# Patient Record
Sex: FEMALE | Race: WHITE
[De-identification: names, ages, dates, MRNs, and addresses within clinical notes are randomized per-mention and may not be internally consistent; named-entity substitution may affect disease eponyms.]

---

## 2020-02-20 ENCOUNTER — HOSPITAL ENCOUNTER (INPATIENT)
Dept: HOSPITAL 54 - ER | Age: 70
LOS: 7 days | Discharge: NURSING, REHAB OR LONG-TERM CARE | DRG: 917 | End: 2020-02-27
Attending: NURSE PRACTITIONER | Admitting: NURSE PRACTITIONER
Payer: COMMERCIAL

## 2020-02-20 VITALS — SYSTOLIC BLOOD PRESSURE: 132 MMHG | DIASTOLIC BLOOD PRESSURE: 75 MMHG

## 2020-02-20 VITALS — SYSTOLIC BLOOD PRESSURE: 129 MMHG | DIASTOLIC BLOOD PRESSURE: 77 MMHG

## 2020-02-20 VITALS — SYSTOLIC BLOOD PRESSURE: 132 MMHG | DIASTOLIC BLOOD PRESSURE: 72 MMHG

## 2020-02-20 VITALS — DIASTOLIC BLOOD PRESSURE: 78 MMHG | SYSTOLIC BLOOD PRESSURE: 130 MMHG

## 2020-02-20 VITALS — SYSTOLIC BLOOD PRESSURE: 137 MMHG | DIASTOLIC BLOOD PRESSURE: 71 MMHG

## 2020-02-20 VITALS — SYSTOLIC BLOOD PRESSURE: 136 MMHG | DIASTOLIC BLOOD PRESSURE: 92 MMHG

## 2020-02-20 VITALS — SYSTOLIC BLOOD PRESSURE: 134 MMHG | DIASTOLIC BLOOD PRESSURE: 72 MMHG

## 2020-02-20 VITALS — SYSTOLIC BLOOD PRESSURE: 131 MMHG | DIASTOLIC BLOOD PRESSURE: 76 MMHG

## 2020-02-20 VITALS — SYSTOLIC BLOOD PRESSURE: 135 MMHG | DIASTOLIC BLOOD PRESSURE: 75 MMHG

## 2020-02-20 VITALS — BODY MASS INDEX: 19.63 KG/M2 | HEIGHT: 64 IN | WEIGHT: 115 LBS

## 2020-02-20 VITALS — SYSTOLIC BLOOD PRESSURE: 139 MMHG | DIASTOLIC BLOOD PRESSURE: 75 MMHG

## 2020-02-20 VITALS — DIASTOLIC BLOOD PRESSURE: 77 MMHG | SYSTOLIC BLOOD PRESSURE: 134 MMHG

## 2020-02-20 VITALS — SYSTOLIC BLOOD PRESSURE: 124 MMHG | DIASTOLIC BLOOD PRESSURE: 67 MMHG

## 2020-02-20 VITALS — DIASTOLIC BLOOD PRESSURE: 78 MMHG | SYSTOLIC BLOOD PRESSURE: 132 MMHG

## 2020-02-20 VITALS — SYSTOLIC BLOOD PRESSURE: 137 MMHG | DIASTOLIC BLOOD PRESSURE: 58 MMHG

## 2020-02-20 DIAGNOSIS — Z88.0: ICD-10-CM

## 2020-02-20 DIAGNOSIS — I12.9: ICD-10-CM

## 2020-02-20 DIAGNOSIS — D64.9: ICD-10-CM

## 2020-02-20 DIAGNOSIS — K56.41: ICD-10-CM

## 2020-02-20 DIAGNOSIS — A87.9: ICD-10-CM

## 2020-02-20 DIAGNOSIS — E87.2: ICD-10-CM

## 2020-02-20 DIAGNOSIS — R41.3: ICD-10-CM

## 2020-02-20 DIAGNOSIS — R40.2433: ICD-10-CM
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
## 2020-02-20 DIAGNOSIS — M19.90: ICD-10-CM

## 2020-02-20 DIAGNOSIS — G92: ICD-10-CM

## 2020-02-20 DIAGNOSIS — E86.0: ICD-10-CM

## 2020-02-20 DIAGNOSIS — J69.0: ICD-10-CM

## 2020-02-20 DIAGNOSIS — R26.81: ICD-10-CM

## 2020-02-20 DIAGNOSIS — N18.9: ICD-10-CM

## 2020-02-20 DIAGNOSIS — N17.0: ICD-10-CM

## 2020-02-20 DIAGNOSIS — A41.9: ICD-10-CM

## 2020-02-20 DIAGNOSIS — F32.9: ICD-10-CM

## 2020-02-20 DIAGNOSIS — T42.4X1A: Primary | ICD-10-CM

## 2020-02-20 DIAGNOSIS — S06.6X0A: ICD-10-CM

## 2020-02-20 DIAGNOSIS — M62.82: ICD-10-CM

## 2020-02-20 DIAGNOSIS — M06.9: ICD-10-CM
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
## 2020-02-20 DIAGNOSIS — J32.9: ICD-10-CM

## 2020-02-20 DIAGNOSIS — E03.9: ICD-10-CM

## 2020-02-20 DIAGNOSIS — I21.4: ICD-10-CM

## 2020-02-20 DIAGNOSIS — E78.5: ICD-10-CM

## 2020-02-20 LAB
ALBUMIN SERPL BCP-MCNC: 3.2 G/DL (ref 3.4–5)
ALP SERPL-CCNC: 134 U/L (ref 46–116)
ALT SERPL W P-5'-P-CCNC: 106 U/L (ref 12–78)
APAP SERPL-MCNC: 0 UG/ML (ref 10–30)
AST SERPL W P-5'-P-CCNC: 139 U/L (ref 15–37)
BASOPHILS # BLD AUTO: 0 /CMM (ref 0–0.2)
BASOPHILS NFR BLD AUTO: 0.1 % (ref 0–2)
BILIRUB DIRECT SERPL-MCNC: 0.1 MG/DL (ref 0–0.2)
BILIRUB SERPL-MCNC: 0.3 MG/DL (ref 0.2–1)
BUN SERPL-MCNC: 30 MG/DL (ref 7–18)
CALCIUM SERPL-MCNC: 9.4 MG/DL (ref 8.5–10.1)
CHLORIDE SERPL-SCNC: 105 MMOL/L (ref 98–107)
CO2 SERPL-SCNC: 20 MMOL/L (ref 21–32)
CREAT SERPL-MCNC: 1.7 MG/DL (ref 0.6–1.3)
EOSINOPHIL NFR BLD AUTO: 0 % (ref 0–6)
ETHANOL SERPL-MCNC: < 3 MG/DL (ref 0–0)
GLUCOSE SERPL-MCNC: 144 MG/DL (ref 74–106)
HCT VFR BLD AUTO: 32 % (ref 33–45)
HGB BLD-MCNC: 10.6 G/DL (ref 11.5–14.8)
LYMPHOCYTES NFR BLD AUTO: 0.6 /CMM (ref 0.8–4.8)
LYMPHOCYTES NFR BLD AUTO: 3.7 % (ref 20–44)
MCHC RBC AUTO-ENTMCNC: 33 G/DL (ref 31–36)
MCV RBC AUTO: 97 FL (ref 82–100)
MONOCYTES NFR BLD AUTO: 0.4 /CMM (ref 0.1–1.3)
MONOCYTES NFR BLD AUTO: 2.6 % (ref 2–12)
NEUTROPHILS # BLD AUTO: 16.2 /CMM (ref 1.8–8.9)
NEUTROPHILS NFR BLD AUTO: 93.6 % (ref 43–81)
PH UR STRIP: 5.5 [PH] (ref 5–8)
PLATELET # BLD AUTO: 592 /CMM (ref 150–450)
POTASSIUM SERPL-SCNC: 3.9 MMOL/L (ref 3.5–5.1)
PROT SERPL-MCNC: 7.2 G/DL (ref 6.4–8.2)
RBC # BLD AUTO: 3.31 MIL/UL (ref 4–5.2)
RBC #/AREA URNS HPF: (no result) /HPF (ref 0–2)
SALICYLATES SERPL-MCNC: 1.6 MG/DL (ref 2.8–20)
SODIUM SERPL-SCNC: 142 MMOL/L (ref 136–145)
UROBILINOGEN UR STRIP-MCNC: 0.2 EU/DL
WBC #/AREA URNS HPF: (no result) /HPF (ref 0–3)
WBC NRBC COR # BLD AUTO: 17.3 K/UL (ref 4.3–11)

## 2020-02-20 PROCEDURE — G0378 HOSPITAL OBSERVATION PER HR: HCPCS

## 2020-02-20 PROCEDURE — A4216 STERILE WATER/SALINE, 10 ML: HCPCS

## 2020-02-20 PROCEDURE — G0480 DRUG TEST DEF 1-7 CLASSES: HCPCS

## 2020-02-20 RX ADMIN — ATORVASTATIN CALCIUM SCH MG: 10 TABLET, FILM COATED ORAL at 22:00

## 2020-02-20 RX ADMIN — Medication SCH MG: at 21:00

## 2020-02-20 RX ADMIN — Medication SCH MG: at 17:00

## 2020-02-20 RX ADMIN — GABAPENTIN SCH MG: 300 CAPSULE ORAL at 17:00

## 2020-02-20 RX ADMIN — MEROPENEM SCH MLS/HR: 1 INJECTION INTRAVENOUS at 18:09

## 2020-02-20 RX ADMIN — SODIUM CHLORIDE PRN MLS/HR: 9 INJECTION, SOLUTION INTRAVENOUS at 15:53

## 2020-02-20 RX ADMIN — GABAPENTIN SCH MG: 400 CAPSULE ORAL at 22:00

## 2020-02-20 RX ADMIN — PANTOPRAZOLE SODIUM SCH MG: 40 TABLET, DELAYED RELEASE ORAL at 16:30

## 2020-02-20 NOTE — NUR
ICU RN. INITIAL ASSESSMENT. RECEIVED THE PT REST ON THE BED, VERY LETHARGIC. NAME 
RESPONDING. CARDIAC MONITOR SHOWING S TACH. ROOM AIR SAT 99%. NO ACUTE DISTRESS NOTED. HOB 
ELEVATED. PT IS NPO. KATTY SOFT WRIST RESTRAINT CHECKED AND RELEASED, NO INJURY OR REDNESS 
NOTED. IV RT AC 20G.  ML/H. WILL CONTINUE TO MONITOR VITALS.

## 2020-02-20 NOTE — NUR
RECEIVED PT FROM ER TO . PT NON VERBAL, RESPONSIVE TO TOUCH AND PAINFUL STIMULI ONLY, 
OPENS EYES OCCASIONALLY, DOES NOT FOLLOW COMMANDS. 2 SONS BY BEDSIDE. REPORTED THEY WERE 
UNABLE TO REACH HER BY PHONE, DROVE TO HER APARTMENT AND FOUND HER ON THE FLOOR UNCONSCIOUS.

PT ON ROOM AIR, SATURATING WELL, RESPIRATIONS EVEN AND UNLABORED, NO SIGNS OF RESPIRATORY 
DISTRESS NOTED. IV SITE ON RIGHT AC G20 INTACT, PATENT, NS INFUSING, NO SIGNS OF 
INFILTRATION NOTED. SINUS TACHY ON MONITOR . SOFT BILATERAL WRIST RESTRAINTS IN PLACE, 
SKIN CHECKED FOR CIRCULATION AND BILATERAL PULSES. HEAD TO TOE ASSESSMENT PERFORMED- LEFT 
FOREARM SKIN TEAR NOTED, PHOTO TAKEN, PLACED INTO CHART. CLEANSED SKIN TEAR WITH NS AND 
COVERED WOUND WITH MEPILEX. BED IN LOW POSITION, LOCKED, CALL LIGHT WITHIN REACH. WILL 
CONTINUE TO MONITOR CLOSELY.

## 2020-02-20 NOTE — NUR
ATTEMPTED MULTIPLE TIMES TO INSERT COOK CATHETER, CHARGE NURSE MARCELINA SCHNEIDER ATTEMPTED 
TOO. UNSUCCESSFUL. ANTONIA NOTIFIED.

## 2020-02-21 VITALS — DIASTOLIC BLOOD PRESSURE: 97 MMHG | SYSTOLIC BLOOD PRESSURE: 137 MMHG

## 2020-02-21 VITALS — SYSTOLIC BLOOD PRESSURE: 154 MMHG | DIASTOLIC BLOOD PRESSURE: 50 MMHG

## 2020-02-21 VITALS — SYSTOLIC BLOOD PRESSURE: 132 MMHG | DIASTOLIC BLOOD PRESSURE: 64 MMHG

## 2020-02-21 VITALS — DIASTOLIC BLOOD PRESSURE: 76 MMHG | SYSTOLIC BLOOD PRESSURE: 128 MMHG

## 2020-02-21 VITALS — SYSTOLIC BLOOD PRESSURE: 137 MMHG | DIASTOLIC BLOOD PRESSURE: 89 MMHG

## 2020-02-21 VITALS — SYSTOLIC BLOOD PRESSURE: 138 MMHG | DIASTOLIC BLOOD PRESSURE: 73 MMHG

## 2020-02-21 VITALS — SYSTOLIC BLOOD PRESSURE: 139 MMHG | DIASTOLIC BLOOD PRESSURE: 73 MMHG

## 2020-02-21 VITALS — DIASTOLIC BLOOD PRESSURE: 77 MMHG | SYSTOLIC BLOOD PRESSURE: 140 MMHG

## 2020-02-21 VITALS — DIASTOLIC BLOOD PRESSURE: 49 MMHG | SYSTOLIC BLOOD PRESSURE: 126 MMHG

## 2020-02-21 VITALS — SYSTOLIC BLOOD PRESSURE: 133 MMHG | DIASTOLIC BLOOD PRESSURE: 80 MMHG

## 2020-02-21 VITALS — SYSTOLIC BLOOD PRESSURE: 133 MMHG | DIASTOLIC BLOOD PRESSURE: 72 MMHG

## 2020-02-21 VITALS — DIASTOLIC BLOOD PRESSURE: 73 MMHG | SYSTOLIC BLOOD PRESSURE: 129 MMHG

## 2020-02-21 VITALS — SYSTOLIC BLOOD PRESSURE: 133 MMHG | DIASTOLIC BLOOD PRESSURE: 79 MMHG

## 2020-02-21 VITALS — SYSTOLIC BLOOD PRESSURE: 139 MMHG | DIASTOLIC BLOOD PRESSURE: 85 MMHG

## 2020-02-21 VITALS — DIASTOLIC BLOOD PRESSURE: 76 MMHG | SYSTOLIC BLOOD PRESSURE: 129 MMHG

## 2020-02-21 VITALS — DIASTOLIC BLOOD PRESSURE: 77 MMHG | SYSTOLIC BLOOD PRESSURE: 128 MMHG

## 2020-02-21 VITALS — DIASTOLIC BLOOD PRESSURE: 69 MMHG | SYSTOLIC BLOOD PRESSURE: 126 MMHG

## 2020-02-21 VITALS — SYSTOLIC BLOOD PRESSURE: 124 MMHG | DIASTOLIC BLOOD PRESSURE: 77 MMHG

## 2020-02-21 VITALS — SYSTOLIC BLOOD PRESSURE: 137 MMHG | DIASTOLIC BLOOD PRESSURE: 77 MMHG

## 2020-02-21 VITALS — SYSTOLIC BLOOD PRESSURE: 140 MMHG | DIASTOLIC BLOOD PRESSURE: 78 MMHG

## 2020-02-21 VITALS — SYSTOLIC BLOOD PRESSURE: 135 MMHG | DIASTOLIC BLOOD PRESSURE: 81 MMHG

## 2020-02-21 VITALS — DIASTOLIC BLOOD PRESSURE: 70 MMHG | SYSTOLIC BLOOD PRESSURE: 120 MMHG

## 2020-02-21 VITALS — SYSTOLIC BLOOD PRESSURE: 135 MMHG | DIASTOLIC BLOOD PRESSURE: 78 MMHG

## 2020-02-21 VITALS — DIASTOLIC BLOOD PRESSURE: 69 MMHG | SYSTOLIC BLOOD PRESSURE: 138 MMHG

## 2020-02-21 VITALS — SYSTOLIC BLOOD PRESSURE: 131 MMHG | DIASTOLIC BLOOD PRESSURE: 68 MMHG

## 2020-02-21 VITALS — DIASTOLIC BLOOD PRESSURE: 58 MMHG | SYSTOLIC BLOOD PRESSURE: 96 MMHG

## 2020-02-21 VITALS — SYSTOLIC BLOOD PRESSURE: 118 MMHG | DIASTOLIC BLOOD PRESSURE: 69 MMHG

## 2020-02-21 VITALS — SYSTOLIC BLOOD PRESSURE: 137 MMHG | DIASTOLIC BLOOD PRESSURE: 72 MMHG

## 2020-02-21 VITALS — DIASTOLIC BLOOD PRESSURE: 65 MMHG | SYSTOLIC BLOOD PRESSURE: 128 MMHG

## 2020-02-21 VITALS — SYSTOLIC BLOOD PRESSURE: 128 MMHG | DIASTOLIC BLOOD PRESSURE: 63 MMHG

## 2020-02-21 VITALS — DIASTOLIC BLOOD PRESSURE: 80 MMHG | SYSTOLIC BLOOD PRESSURE: 133 MMHG

## 2020-02-21 LAB
APPEARANCE UR: CLEAR
BASE EXCESS BLDA CALC-SCNC: -5.4 MMOL/L
BASE EXCESS BLDA CALC-SCNC: -7.3 MMOL/L
BASOPHILS # BLD AUTO: 0 /CMM (ref 0–0.2)
BASOPHILS NFR BLD AUTO: 0.3 % (ref 0–2)
BILIRUB UR QL STRIP: NEGATIVE
BUN SERPL-MCNC: 21 MG/DL (ref 7–18)
CALCIUM SERPL-MCNC: 8.1 MG/DL (ref 8.5–10.1)
CHLORIDE SERPL-SCNC: 110 MMOL/L (ref 98–107)
CHOLEST SERPL-MCNC: 147 MG/DL (ref ?–200)
CO2 SERPL-SCNC: 21 MMOL/L (ref 21–32)
COLOR UR: YELLOW
CREAT SERPL-MCNC: 1.1 MG/DL (ref 0.6–1.3)
CREAT UR-MCNC: 70.6 MG/DL (ref 30–125)
DO-HGB MFR BLDA: 35.6 MMHG
DO-HGB MFR BLDA: 42.9 MMHG
EOSINOPHIL NFR BLD AUTO: 0 % (ref 0–6)
GLUCOSE CSF-MCNC: 54 MG/DL (ref 40–70)
GLUCOSE SERPL-MCNC: 101 MG/DL (ref 74–106)
GLUCOSE UR STRIP-MCNC: NEGATIVE MG/DL
HCT VFR BLD AUTO: 27 % (ref 33–45)
HDLC SERPL-MCNC: 60 MG/DL (ref 40–60)
HGB BLD-MCNC: 8.9 G/DL (ref 11.5–14.8)
HGB UR QL STRIP: (no result) ERY/UL
INHALED O2 CONCENTRATION: 21 %
INHALED O2 CONCENTRATION: 21 %
KETONES UR STRIP-MCNC: 15 MG/DL
LDLC SERPL DIRECT ASSAY-MCNC: 72 MG/DL (ref 0–99)
LEUKOCYTE ESTERASE UR QL STRIP: NEGATIVE
LYMPHOCYTES NFR BLD AUTO: 0.8 /CMM (ref 0.8–4.8)
LYMPHOCYTES NFR BLD AUTO: 6 % (ref 20–44)
MAGNESIUM SERPL-MCNC: 2.2 MG/DL (ref 1.8–2.4)
MCHC RBC AUTO-ENTMCNC: 34 G/DL (ref 31–36)
MCV RBC AUTO: 95 FL (ref 82–100)
MONOCYTES NFR BLD AUTO: 0.6 /CMM (ref 0.1–1.3)
MONOCYTES NFR BLD AUTO: 4.3 % (ref 2–12)
NEUTROPHILS # BLD AUTO: 12.1 /CMM (ref 1.8–8.9)
NEUTROPHILS NFR BLD AUTO: 89.4 % (ref 43–81)
NITRITE UR QL STRIP: NEGATIVE
PCO2 TEMP ADJ BLDA: 17.9 MMHG (ref 35–45)
PCO2 TEMP ADJ BLDA: 22.4 MMHG (ref 35–45)
PH TEMP ADJ BLDA: 7.49 [PH] (ref 7.35–7.45)
PH TEMP ADJ BLDA: 7.51 [PH] (ref 7.35–7.45)
PH UR STRIP: 6 [PH] (ref 5–8)
PHOSPHATE SERPL-MCNC: 2.9 MG/DL (ref 2.5–4.9)
PLATELET # BLD AUTO: 403 /CMM (ref 150–450)
PO2 TEMP ADJ BLDA: 85.4 MMHG (ref 75–100)
PO2 TEMP ADJ BLDA: 87.3 MMHG (ref 75–100)
POTASSIUM SERPL-SCNC: 3.4 MMOL/L (ref 3.5–5.1)
PROT CSF-MCNC: 147.5 MG/DL (ref 15–45)
PROT UR QL STRIP: NEGATIVE MG/DL
PROT UR-MCNC: 51.3 MG/DL (ref 0–11.9)
RBC # BLD AUTO: 2.8 MIL/UL (ref 4–5.2)
RBC #/AREA URNS HPF: (no result) /HPF (ref 0–2)
SAO2 % BLDA: 96.1 % (ref 92–98.5)
SAO2 % BLDA: 96.1 % (ref 92–98.5)
SODIUM SERPL-SCNC: 144 MMOL/L (ref 136–145)
SODIUM UR-SCNC: 139 MMOL/L (ref 40–220)
TRIGL SERPL-MCNC: 60 MG/DL (ref 30–150)
UROBILINOGEN UR STRIP-MCNC: 0.2 EU/DL
VENTILATION MODE VENT: (no result)
VENTILATION MODE VENT: (no result)
WBC #/AREA URNS HPF: (no result) /HPF (ref 0–3)
WBC NRBC COR # BLD AUTO: 13.5 K/UL (ref 4.3–11)

## 2020-02-21 RX ADMIN — CALCIUM CARBONATE-CHOLECALCIFEROL TAB 250 MG-125 UNIT SCH UDTAB: 250-125 TAB at 08:03

## 2020-02-21 RX ADMIN — ATORVASTATIN CALCIUM SCH MG: 10 TABLET, FILM COATED ORAL at 21:11

## 2020-02-21 RX ADMIN — LEVOTHYROXINE SODIUM SCH MCG: 100 TABLET ORAL at 08:03

## 2020-02-21 RX ADMIN — PANTOPRAZOLE SODIUM SCH MG: 40 TABLET, DELAYED RELEASE ORAL at 08:04

## 2020-02-21 RX ADMIN — Medication SCH MG: at 17:16

## 2020-02-21 RX ADMIN — Medication SCH MG: at 21:11

## 2020-02-21 RX ADMIN — GABAPENTIN SCH MG: 400 CAPSULE ORAL at 21:11

## 2020-02-21 RX ADMIN — DEXTROSE MONOHYDRATE SCH MLS/HR: 50 INJECTION, SOLUTION INTRAVENOUS at 16:46

## 2020-02-21 RX ADMIN — GABAPENTIN SCH MG: 300 CAPSULE ORAL at 08:04

## 2020-02-21 RX ADMIN — MEROPENEM SCH MLS/HR: 1 INJECTION INTRAVENOUS at 18:07

## 2020-02-21 RX ADMIN — DEXTROSE MONOHYDRATE SCH MLS/HR: 50 INJECTION, SOLUTION INTRAVENOUS at 21:10

## 2020-02-21 RX ADMIN — SODIUM CHLORIDE PRN MLS/HR: 9 INJECTION, SOLUTION INTRAVENOUS at 02:30

## 2020-02-21 RX ADMIN — Medication SCH MG: at 09:00

## 2020-02-21 RX ADMIN — GABAPENTIN SCH MG: 300 CAPSULE ORAL at 13:08

## 2020-02-21 RX ADMIN — MEROPENEM SCH MLS/HR: 1 INJECTION INTRAVENOUS at 06:52

## 2020-02-21 RX ADMIN — Medication SCH MG: at 08:04

## 2020-02-21 RX ADMIN — FOLIC ACID SCH MG: 1 TABLET ORAL at 08:03

## 2020-02-21 RX ADMIN — GABAPENTIN SCH MG: 300 CAPSULE ORAL at 17:16

## 2020-02-21 NOTE — NUR
RN OPEN NOTES



RECEIVED PATIENT AWAKE IN BED WITH FAMILY AT BEDSIDE. A/OX3. NO SIGNS OF DISTRESS OR 
DISCOMFORT. BREATHING EVEN AND UNLABORED. IV ACCESS IN RFA, PATENT AND INTACT, NO SIGNS OF 
REDNESS OR INFILTRATION. F/C INTACT DRAINING CLEAR YELLOW FLUID. BED IN LOW LOCKED POSITION 
WITH SIDE RAILS X2. BED ALARM ON. CALL LIGHT WITHIN REACH. WILL CONTINUE TO MONITOR.

## 2020-02-21 NOTE — NUR
MS/RN RECEIVING NOTES



PATIENT WAS RECEIVED FROM ICU ACCOMPANIED BY SUSU HEWITT. PATIENT IN STABLE CONDITION. PATIENT 
IS A/O X3. NOTED WITH IV ACCESS ON RFA #20G. INTACT AND PATENT. FLUSHING WELL. ALL NEEDS 
ANTICIPATED CALL LIGHT WITHIN REACHED. BED LOCKED AND IN LOWEST POSITION. SAFETY MAINTAINED. 
WILL CONTINUE TO MONITOR CLOSELY. ENDORSED TO PM NURSE FOR BOWEN.

## 2020-02-21 NOTE — NUR
ICU RN. PT AWAKE, ALERT, FOLLOW COMMANDS. SOME TIMES FORGET FULLNESS.  IV PULLED OUT. NEW IV 
PLACED. KATTY SOFT WRIST RESTRAINT INITIATED,IV RT HAND 20G. IV FLUIDS NS 125ML/H. HOB 
ELEVATED, FC PATENT. URINE DRAINING, KATTY SOFT WRIST RESTRAINT CHECKED AND RELEASED. NO 
INJURY  OR REDNESS NOTED. WILL CONTINUE TO MONITOR VITALS.

## 2020-02-21 NOTE — NUR
WOUND CARE CONSULT: PT PRESENTS WITH INCONTINENCE AND DRY ABRASIONS, BRUISING TO ARMS, 
PRESENT ON ADMISSION. RECOMMENDATIONS MADE FOR SKIN PROTECTION. DISCUSSED WITH NURSING 
STAFF. WILL SEE PRN. MD IN AGREEMENT WITH PLAN OF CARE.

-------------------------------------------------------------------------------

Addendum: 02/21/20 at 0833 by ROSALIA BEDOLLA WNDNU

-------------------------------------------------------------------------------

CORRECTION: PT INCONTINENT OF STOOL. COOK CATH NOTED.

## 2020-02-21 NOTE — NUR
received pt from night shift, s/p subarachnoid hemorrhage,  a/o x2, follows commands, 
confused at times, SR, RA sat well, NPO, f/c good output, restraints on, v/s stable, no 
pain, pt turned and repositioned.

## 2020-02-21 NOTE — NUR
pt is resting in the bed, LP done by Dr Tejeda, MRI of brain ordered instead on CT of head, 
pt is 

a/o x3, follows commands, RA, tolerates diet, good urine output, v/s stable, no pain.

## 2020-02-22 VITALS — DIASTOLIC BLOOD PRESSURE: 69 MMHG | SYSTOLIC BLOOD PRESSURE: 119 MMHG

## 2020-02-22 VITALS — DIASTOLIC BLOOD PRESSURE: 76 MMHG | SYSTOLIC BLOOD PRESSURE: 128 MMHG

## 2020-02-22 VITALS — SYSTOLIC BLOOD PRESSURE: 110 MMHG | DIASTOLIC BLOOD PRESSURE: 68 MMHG

## 2020-02-22 VITALS — DIASTOLIC BLOOD PRESSURE: 70 MMHG | SYSTOLIC BLOOD PRESSURE: 118 MMHG

## 2020-02-22 LAB
ALBUMIN SERPL BCP-MCNC: 2.4 G/DL (ref 3.4–5)
ALP SERPL-CCNC: 98 U/L (ref 46–116)
ALT SERPL W P-5'-P-CCNC: 75 U/L (ref 12–78)
AST SERPL W P-5'-P-CCNC: 82 U/L (ref 15–37)
BASOPHILS # BLD AUTO: 0 /CMM (ref 0–0.2)
BASOPHILS NFR BLD AUTO: 0.1 % (ref 0–2)
BILIRUB SERPL-MCNC: 0.2 MG/DL (ref 0.2–1)
BUN SERPL-MCNC: 22 MG/DL (ref 7–18)
CALCIUM SERPL-MCNC: 8.1 MG/DL (ref 8.5–10.1)
CHLORIDE SERPL-SCNC: 107 MMOL/L (ref 98–107)
CK SERPL-CCNC: 838 U/L (ref 26–192)
CO2 SERPL-SCNC: 25 MMOL/L (ref 21–32)
CREAT SERPL-MCNC: 1.3 MG/DL (ref 0.6–1.3)
EOSINOPHIL NFR BLD AUTO: 0 % (ref 0–6)
GLUCOSE SERPL-MCNC: 119 MG/DL (ref 74–106)
HCT VFR BLD AUTO: 27 % (ref 33–45)
HGB BLD-MCNC: 9 G/DL (ref 11.5–14.8)
LYMPHOCYTES NFR BLD AUTO: 0.9 /CMM (ref 0.8–4.8)
LYMPHOCYTES NFR BLD AUTO: 6.1 % (ref 20–44)
MAGNESIUM SERPL-MCNC: 1.8 MG/DL (ref 1.8–2.4)
MCHC RBC AUTO-ENTMCNC: 33 G/DL (ref 31–36)
MCV RBC AUTO: 96 FL (ref 82–100)
MONOCYTES NFR BLD AUTO: 0.7 /CMM (ref 0.1–1.3)
MONOCYTES NFR BLD AUTO: 4.7 % (ref 2–12)
NEUTROPHILS # BLD AUTO: 13.7 /CMM (ref 1.8–8.9)
NEUTROPHILS NFR BLD AUTO: 89.1 % (ref 43–81)
PHOSPHATE SERPL-MCNC: 1.9 MG/DL (ref 2.5–4.9)
PLATELET # BLD AUTO: 438 /CMM (ref 150–450)
POTASSIUM SERPL-SCNC: 3.3 MMOL/L (ref 3.5–5.1)
PROT SERPL-MCNC: 5.8 G/DL (ref 6.4–8.2)
RBC # BLD AUTO: 2.86 MIL/UL (ref 4–5.2)
SODIUM SERPL-SCNC: 140 MMOL/L (ref 136–145)
WBC NRBC COR # BLD AUTO: 15.4 K/UL (ref 4.3–11)

## 2020-02-22 RX ADMIN — DEXTROSE MONOHYDRATE SCH MLS/HR: 50 INJECTION, SOLUTION INTRAVENOUS at 12:06

## 2020-02-22 RX ADMIN — GABAPENTIN SCH MG: 300 CAPSULE ORAL at 08:52

## 2020-02-22 RX ADMIN — ATORVASTATIN CALCIUM SCH MG: 10 TABLET, FILM COATED ORAL at 21:28

## 2020-02-22 RX ADMIN — PANTOPRAZOLE SODIUM SCH MG: 40 TABLET, DELAYED RELEASE ORAL at 08:52

## 2020-02-22 RX ADMIN — MEROPENEM SCH MLS/HR: 1 INJECTION INTRAVENOUS at 06:56

## 2020-02-22 RX ADMIN — Medication SCH MG: at 09:00

## 2020-02-22 RX ADMIN — GABAPENTIN SCH MG: 400 CAPSULE ORAL at 21:28

## 2020-02-22 RX ADMIN — CALCIUM CARBONATE-CHOLECALCIFEROL TAB 250 MG-125 UNIT SCH UDTAB: 250-125 TAB at 08:52

## 2020-02-22 RX ADMIN — GABAPENTIN SCH MG: 300 CAPSULE ORAL at 12:05

## 2020-02-22 RX ADMIN — DEXTROSE MONOHYDRATE SCH MLS/HR: 50 INJECTION, SOLUTION INTRAVENOUS at 13:37

## 2020-02-22 RX ADMIN — FOLIC ACID SCH MG: 1 TABLET ORAL at 08:52

## 2020-02-22 RX ADMIN — Medication SCH MG: at 17:43

## 2020-02-22 RX ADMIN — DEXTROSE MONOHYDRATE SCH MLS/HR: 50 INJECTION, SOLUTION INTRAVENOUS at 05:53

## 2020-02-22 RX ADMIN — LEVOTHYROXINE SODIUM SCH MCG: 100 TABLET ORAL at 08:52

## 2020-02-22 RX ADMIN — Medication SCH MG: at 08:52

## 2020-02-22 RX ADMIN — Medication SCH MG: at 21:31

## 2020-02-22 RX ADMIN — DEXTROSE MONOHYDRATE SCH MLS/HR: 50 INJECTION, SOLUTION INTRAVENOUS at 21:28

## 2020-02-22 RX ADMIN — GABAPENTIN SCH MG: 300 CAPSULE ORAL at 17:43

## 2020-02-22 RX ADMIN — MEROPENEM SCH MLS/HR: 1 INJECTION INTRAVENOUS at 17:42

## 2020-02-22 NOTE — NUR
patient back in her room, S/P CTA head, tolerated the procedure well. In no acute distress, 
Alert and oriented to her base line.  Will continue to monitor. Son at bed side, Encouraged 
to drink fluids

## 2020-02-22 NOTE — NUR
CTA head results relayed to Mateo Pappas and April Farooq with no new orders at this 
time, Will closely monitor patient

## 2020-02-22 NOTE — NUR
appeared alert, but some memory deficits noted, younger son just stopped by to visit, left.  
When asked her whether she saw the son, "  nope , nobody here yet.".  asked how many sons 
she has had, -  wait for awhile before she could remember, now to MRI of brain

## 2020-02-22 NOTE — NUR
PATIENT REMAIN ALERT,NO ACUTE DISTRESS,NSR, SON AT BEDSIDE CALL LIGHT AT REACH,AWAITS 
RADIOLY TO DO CT SCAN AS ORDERED,SON SIGNED CONSENT.

## 2020-02-22 NOTE — NUR
RN CLOSING NOTES





PATIENT RESTING COMFORTABLY IN BED. A/OX3 WITH PERIODS OF FORGETFULNESS. NO SIGNS OF 
DISTRESS OR DISCOMFORT. BREATHING EVEN AND UNLABORED. IV ACCESS IN RFA WITH ZOVIRAX 
INFUSING, PATENT AND INTACT, NO SIGNS OF REDNESS OR INFILTRATION. F/C INTACT DRAINING CLEAR 
YELLOW FLUID. ALL NEEDS MET. NO SIGNIFICANT CHANGES THROUGH THE NIGHT. BED IN LOW LOCKED 
POSITION WITH SIDE RAILS X2. BED ALARM ON. CALL LIGHT WITHIN REACH. WILL ENDORSE TO AM SHIFT 
FOR BOWEN.

## 2020-02-22 NOTE — NUR
RELAYED MRI RESULT TO DR. CLINTON AND VERONICA KNIGHT,SON AT BEDSIDE AND ANXIOUS ABOUT MRI 
RESULT,PRIMARY RN CLARENCE READ RESULT TO SON PER SON REQUEST,VERONICA KNIGHT SPOKE ON THE PHONE 
TO SON AND EXPLAIN PLAN OF CARE,FAMILY  REASSURED ,PLACED PATIENT ON TELEMETRY.WILL CONTINUE 
TO MONITOR.

## 2020-02-22 NOTE — NUR
TELE/RN notes 

Patient received in bed, awake, A/O x3, denies any pain, no SOB, breathing even and 
unlabored. On RA, sat 98%. IV site intact, patent, Leo cath intact, draining with yellow 
color urine. Sinus tachy on the monitor, rate 108. awaiting CTA Head with contrast, both 
Sons at bed side, called Radiology, will be here soon to  patient. Safety maintained, 
bed at the lowest locked position. Call light within reach. Will continue to monitor as per 
plan of care.

## 2020-02-23 VITALS — SYSTOLIC BLOOD PRESSURE: 112 MMHG | DIASTOLIC BLOOD PRESSURE: 77 MMHG

## 2020-02-23 VITALS — SYSTOLIC BLOOD PRESSURE: 134 MMHG | DIASTOLIC BLOOD PRESSURE: 76 MMHG

## 2020-02-23 VITALS — DIASTOLIC BLOOD PRESSURE: 81 MMHG | SYSTOLIC BLOOD PRESSURE: 139 MMHG

## 2020-02-23 VITALS — SYSTOLIC BLOOD PRESSURE: 98 MMHG | DIASTOLIC BLOOD PRESSURE: 58 MMHG

## 2020-02-23 VITALS — DIASTOLIC BLOOD PRESSURE: 75 MMHG | SYSTOLIC BLOOD PRESSURE: 140 MMHG

## 2020-02-23 LAB
BASOPHILS # BLD AUTO: 0.1 /CMM (ref 0–0.2)
BASOPHILS NFR BLD AUTO: 0.8 % (ref 0–2)
BUN SERPL-MCNC: 18 MG/DL (ref 7–18)
CALCIUM SERPL-MCNC: 7.7 MG/DL (ref 8.5–10.1)
CHLORIDE SERPL-SCNC: 105 MMOL/L (ref 98–107)
CO2 SERPL-SCNC: 23 MMOL/L (ref 21–32)
CREAT SERPL-MCNC: 1.2 MG/DL (ref 0.6–1.3)
EOSINOPHIL NFR BLD AUTO: 0.2 % (ref 0–6)
GLUCOSE SERPL-MCNC: 97 MG/DL (ref 74–106)
HCT VFR BLD AUTO: 26 % (ref 33–45)
HGB BLD-MCNC: 8.5 G/DL (ref 11.5–14.8)
LYMPHOCYTES NFR BLD AUTO: 1.3 /CMM (ref 0.8–4.8)
LYMPHOCYTES NFR BLD AUTO: 9.7 % (ref 20–44)
MAGNESIUM SERPL-MCNC: 1.7 MG/DL (ref 1.8–2.4)
MCHC RBC AUTO-ENTMCNC: 33 G/DL (ref 31–36)
MCV RBC AUTO: 95 FL (ref 82–100)
MONOCYTES NFR BLD AUTO: 0.2 /CMM (ref 0.1–1.3)
MONOCYTES NFR BLD AUTO: 1.8 % (ref 2–12)
NEUTROPHILS # BLD AUTO: 11.8 /CMM (ref 1.8–8.9)
NEUTROPHILS NFR BLD AUTO: 87.5 % (ref 43–81)
PHOSPHATE SERPL-MCNC: 2.5 MG/DL (ref 2.5–4.9)
PLATELET # BLD AUTO: 469 /CMM (ref 150–450)
POTASSIUM SERPL-SCNC: 3.5 MMOL/L (ref 3.5–5.1)
RBC # BLD AUTO: 2.75 MIL/UL (ref 4–5.2)
SODIUM SERPL-SCNC: 140 MMOL/L (ref 136–145)
WBC NRBC COR # BLD AUTO: 13.5 K/UL (ref 4.3–11)

## 2020-02-23 RX ADMIN — ACETAMINOPHEN PRN MG: 325 TABLET ORAL at 04:27

## 2020-02-23 RX ADMIN — DEXTROSE MONOHYDRATE SCH MLS/HR: 50 INJECTION, SOLUTION INTRAVENOUS at 15:53

## 2020-02-23 RX ADMIN — Medication SCH MG: at 17:05

## 2020-02-23 RX ADMIN — FLUTICASONE PROPIONATE SCH GM: 50 SPRAY, METERED NASAL at 08:49

## 2020-02-23 RX ADMIN — Medication SCH MG: at 21:00

## 2020-02-23 RX ADMIN — FLUTICASONE PROPIONATE SCH GM: 50 SPRAY, METERED NASAL at 17:05

## 2020-02-23 RX ADMIN — LEVOTHYROXINE SODIUM SCH MCG: 100 TABLET ORAL at 07:45

## 2020-02-23 RX ADMIN — SORBITOL SOLUTION (BULK) SCH ML: 70 SOLUTION at 18:39

## 2020-02-23 RX ADMIN — MEROPENEM SCH MLS/HR: 1 INJECTION INTRAVENOUS at 04:55

## 2020-02-23 RX ADMIN — MAGNESIUM SULFATE IN DEXTROSE SCH MLS/HR: 10 INJECTION, SOLUTION INTRAVENOUS at 14:32

## 2020-02-23 RX ADMIN — CALCIUM CARBONATE-CHOLECALCIFEROL TAB 250 MG-125 UNIT SCH UDTAB: 250-125 TAB at 08:50

## 2020-02-23 RX ADMIN — DEXTROSE MONOHYDRATE SCH MLS/HR: 50 INJECTION, SOLUTION INTRAVENOUS at 21:00

## 2020-02-23 RX ADMIN — MEROPENEM SCH MLS/HR: 1 INJECTION INTRAVENOUS at 18:39

## 2020-02-23 RX ADMIN — Medication SCH MG: at 09:16

## 2020-02-23 RX ADMIN — GABAPENTIN SCH MG: 300 CAPSULE ORAL at 08:50

## 2020-02-23 RX ADMIN — DEXTROSE MONOHYDRATE SCH MLS/HR: 50 INJECTION, SOLUTION INTRAVENOUS at 04:55

## 2020-02-23 RX ADMIN — Medication SCH MG: at 08:50

## 2020-02-23 RX ADMIN — MAGNESIUM SULFATE IN DEXTROSE SCH MLS/HR: 10 INJECTION, SOLUTION INTRAVENOUS at 12:48

## 2020-02-23 RX ADMIN — GABAPENTIN SCH MG: 300 CAPSULE ORAL at 14:34

## 2020-02-23 RX ADMIN — GABAPENTIN SCH MG: 400 CAPSULE ORAL at 21:00

## 2020-02-23 RX ADMIN — ATORVASTATIN CALCIUM SCH MG: 10 TABLET, FILM COATED ORAL at 21:00

## 2020-02-23 RX ADMIN — GABAPENTIN SCH MG: 300 CAPSULE ORAL at 17:05

## 2020-02-23 RX ADMIN — FOLIC ACID SCH MG: 1 TABLET ORAL at 08:50

## 2020-02-23 RX ADMIN — PANTOPRAZOLE SODIUM SCH MG: 40 TABLET, DELAYED RELEASE ORAL at 07:45

## 2020-02-23 RX ADMIN — DEXTROSE MONOHYDRATE SCH MLS/HR: 50 INJECTION, SOLUTION INTRAVENOUS at 14:34

## 2020-02-23 NOTE — NUR
TELE RN NOTE



RECEIVED PT IN STABLE CONDITION, A/O X1, NOTED IN BED. NO SIGNS OF SOB OR DISTRESS, NO C/O 
PAIN OR N/V. IV IN R AC # 20 IN PLACE. COOK CATH IN PLACE WITH ADEQUATE URINE DRAINING. ALL 
CURRENT NEEDS ATTENDED TO. BED LOW, LOCKED, UPPER RAILS UP AND CALL LIGHT WITHIN REACH. WILL 
CONT. TO MONITOR.

## 2020-02-23 NOTE — NUR
TELE/RN notes 

Patient remained in bed, awake, A/O x3, denies any pain, no SOB, breathing even and 
unlabored. On RA, sat 98%. IV site intact, patent, Leo cath intact, draining with yellow 
color urine. Sinus tachy on the monitor, rate 102. No change in ALOC noted throughout the 
shift. Due meds given as ordered, toelrated well. kept clean and dry. Afebrile.  needs 
attendant. . Safety maintained, bed at the lowest locked position. Call light within reach. 
Endorse to AM shift nurse for BOWEN.

## 2020-02-24 VITALS — SYSTOLIC BLOOD PRESSURE: 118 MMHG | DIASTOLIC BLOOD PRESSURE: 70 MMHG

## 2020-02-24 VITALS — SYSTOLIC BLOOD PRESSURE: 134 MMHG | DIASTOLIC BLOOD PRESSURE: 60 MMHG

## 2020-02-24 VITALS — DIASTOLIC BLOOD PRESSURE: 69 MMHG | SYSTOLIC BLOOD PRESSURE: 118 MMHG

## 2020-02-24 VITALS — DIASTOLIC BLOOD PRESSURE: 66 MMHG | SYSTOLIC BLOOD PRESSURE: 121 MMHG

## 2020-02-24 VITALS — DIASTOLIC BLOOD PRESSURE: 59 MMHG | SYSTOLIC BLOOD PRESSURE: 105 MMHG

## 2020-02-24 VITALS — SYSTOLIC BLOOD PRESSURE: 117 MMHG | DIASTOLIC BLOOD PRESSURE: 57 MMHG

## 2020-02-24 LAB
BASOPHILS # BLD AUTO: 0 /CMM (ref 0–0.2)
BASOPHILS NFR BLD AUTO: 0.4 % (ref 0–2)
BUN SERPL-MCNC: 17 MG/DL (ref 7–18)
CALCIUM SERPL-MCNC: 7.6 MG/DL (ref 8.5–10.1)
CHLORIDE SERPL-SCNC: 103 MMOL/L (ref 98–107)
CO2 SERPL-SCNC: 27 MMOL/L (ref 21–32)
CREAT SERPL-MCNC: 1.2 MG/DL (ref 0.6–1.3)
EOSINOPHIL NFR BLD AUTO: 2.2 % (ref 0–6)
GLUCOSE SERPL-MCNC: 84 MG/DL (ref 74–106)
HCT VFR BLD AUTO: 26 % (ref 33–45)
HGB BLD-MCNC: 8.6 G/DL (ref 11.5–14.8)
LYMPHOCYTES NFR BLD AUTO: 1 /CMM (ref 0.8–4.8)
LYMPHOCYTES NFR BLD AUTO: 10.3 % (ref 20–44)
MAGNESIUM SERPL-MCNC: 2 MG/DL (ref 1.8–2.4)
MCHC RBC AUTO-ENTMCNC: 33 G/DL (ref 31–36)
MCV RBC AUTO: 95 FL (ref 82–100)
MONOCYTES NFR BLD AUTO: 0.1 /CMM (ref 0.1–1.3)
MONOCYTES NFR BLD AUTO: 1.3 % (ref 2–12)
NEUTROPHILS # BLD AUTO: 8.5 /CMM (ref 1.8–8.9)
NEUTROPHILS NFR BLD AUTO: 85.8 % (ref 43–81)
PHOSPHATE SERPL-MCNC: 2.4 MG/DL (ref 2.5–4.9)
PLATELET # BLD AUTO: 497 /CMM (ref 150–450)
POTASSIUM SERPL-SCNC: 3.5 MMOL/L (ref 3.5–5.1)
RBC # BLD AUTO: 2.76 MIL/UL (ref 4–5.2)
SODIUM SERPL-SCNC: 136 MMOL/L (ref 136–145)
WBC NRBC COR # BLD AUTO: 9.9 K/UL (ref 4.3–11)

## 2020-02-24 RX ADMIN — LEVOTHYROXINE SODIUM SCH MCG: 100 TABLET ORAL at 10:31

## 2020-02-24 RX ADMIN — ATORVASTATIN CALCIUM SCH MG: 10 TABLET, FILM COATED ORAL at 21:22

## 2020-02-24 RX ADMIN — Medication SCH MG: at 10:54

## 2020-02-24 RX ADMIN — Medication SCH MG: at 17:02

## 2020-02-24 RX ADMIN — CALCIUM CARBONATE-CHOLECALCIFEROL TAB 250 MG-125 UNIT SCH UDTAB: 250-125 TAB at 10:32

## 2020-02-24 RX ADMIN — Medication SCH MG: at 10:31

## 2020-02-24 RX ADMIN — DEXTROSE MONOHYDRATE SCH MLS/HR: 50 INJECTION, SOLUTION INTRAVENOUS at 21:25

## 2020-02-24 RX ADMIN — ACETAMINOPHEN PRN MG: 325 TABLET ORAL at 11:31

## 2020-02-24 RX ADMIN — DEXTROSE MONOHYDRATE SCH MLS/HR: 50 INJECTION, SOLUTION INTRAVENOUS at 15:02

## 2020-02-24 RX ADMIN — LACTULOSE SCH G: 10 SOLUTION ORAL at 21:25

## 2020-02-24 RX ADMIN — Medication SCH MG: at 21:23

## 2020-02-24 RX ADMIN — Medication SCH MG: at 10:49

## 2020-02-24 RX ADMIN — DEXTROSE MONOHYDRATE SCH MLS/HR: 50 INJECTION, SOLUTION INTRAVENOUS at 12:19

## 2020-02-24 RX ADMIN — MEROPENEM SCH MLS/HR: 1 INJECTION INTRAVENOUS at 05:26

## 2020-02-24 RX ADMIN — FLUTICASONE PROPIONATE SCH GM: 50 SPRAY, METERED NASAL at 10:31

## 2020-02-24 RX ADMIN — GABAPENTIN SCH MG: 300 CAPSULE ORAL at 17:02

## 2020-02-24 RX ADMIN — FOLIC ACID SCH MG: 1 TABLET ORAL at 10:32

## 2020-02-24 RX ADMIN — PANTOPRAZOLE SODIUM SCH MG: 40 TABLET, DELAYED RELEASE ORAL at 10:31

## 2020-02-24 RX ADMIN — GABAPENTIN SCH MG: 400 CAPSULE ORAL at 21:22

## 2020-02-24 RX ADMIN — DEXTROSE MONOHYDRATE SCH MLS/HR: 50 INJECTION, SOLUTION INTRAVENOUS at 04:02

## 2020-02-24 RX ADMIN — GABAPENTIN SCH MG: 300 CAPSULE ORAL at 12:19

## 2020-02-24 RX ADMIN — GABAPENTIN SCH MG: 300 CAPSULE ORAL at 10:48

## 2020-02-24 RX ADMIN — SORBITOL SOLUTION (BULK) SCH ML: 70 SOLUTION at 13:35

## 2020-02-24 RX ADMIN — MEROPENEM SCH MLS/HR: 1 INJECTION INTRAVENOUS at 18:38

## 2020-02-24 RX ADMIN — FLUTICASONE PROPIONATE SCH GM: 50 SPRAY, METERED NASAL at 17:01

## 2020-02-24 NOTE — NUR
TELE RN NOTES

RECEIVED PT AWAKE IN BED WITH HOB ELEVATED. PT ON IV ON LEFT FOREARM PATENT. NO SIGNS OF 
INFILTRATION NO PAIN. ON COOK CATHETER DRAINING LIGHT YELLOW URINE. PT IS RESPONSIVE. NO 
SIGNS OF DISTRESS. NO C/O PAIN OR DISCOMFORT. CALL LIGHT WITHIN REACH. BED ON LOW SETTING 
AND LOCKED. WILL CONTINUE TO MONITOR.

## 2020-02-24 NOTE — NUR
TELE RN NOTES,



RECEIVED PATIENT  AWAKE IN BED A/O TO SELF,  VERY FORGETFUL, BREATHING EVEN AND UNLABORED, 
NO SOB/ACUTE DISTRESS NOTED AT THIS TIME, DENIES PAIN OR DISCOMFORT AT THIS TIME, SITTING ON 
BED EATING DINNER AT THIS TIME, WITH HOB ELEVATED, NSR WITH HR 90S AT THIS TIME, IV ON LEFT 
FOREARM PATENT AND INTACT, TKO, NO SIGNS OF INFILTRATION NOTED AT THIS TIME,  S/P COOK 
CATHETER REMOVAL, PATIENT WANTED TO VOID AND HELP HER TO USE RESTROOM WITH ONE VOID AT THIS 
TIME, CALL LIGHT WITHIN REACH,  BED  LOCKED AND LOWEST POSITION,  BED ALARM ON, EDUCATED ON 
CALL FOR ASSISTANCE, WILL CONTINUE TO MONITOR CLOSELY.

## 2020-02-24 NOTE — NUR
TELE RN NOTE

PER PATIENT'S SON PRABHA, THEY TALKED TO NP ABOUT REMOVING THE COOK CATHETER. MD AGREED. 
REMOVED COOK CATHETER. TOLERATED WELL. OUTPUT OF 350ML. DISCONTINUE FC IN THE SYSTEM. PT NO 
C/O PAIN OR DISCOMFORT. WILL CONT TO MONITOR.

## 2020-02-24 NOTE — NUR
TELE RN NOTE



PT IN STABLE CONDITION, A/O X1, NOTED IN BED. NO SIGNS OF SOB OR DISTRESS, NO C/O PAIN OR 
N/V. IV IN L WRIST # 20 IN PLACE. COOK CATH IN PLACE WITH ADEQUATE URINE DRAINING. ALL 
CURRENT NEEDS ATTENDED TO. BED LOW, LOCKED, UPPER RAILS UP AND CALL LIGHT WITHIN REACH. WILL 
CONT. TO MONITOR AND ENDORSE TO NEXT SHIFT FOR BOWEN.

## 2020-02-25 VITALS — SYSTOLIC BLOOD PRESSURE: 102 MMHG | DIASTOLIC BLOOD PRESSURE: 55 MMHG

## 2020-02-25 VITALS — SYSTOLIC BLOOD PRESSURE: 120 MMHG | DIASTOLIC BLOOD PRESSURE: 82 MMHG

## 2020-02-25 VITALS — DIASTOLIC BLOOD PRESSURE: 56 MMHG | SYSTOLIC BLOOD PRESSURE: 110 MMHG

## 2020-02-25 VITALS — DIASTOLIC BLOOD PRESSURE: 63 MMHG | SYSTOLIC BLOOD PRESSURE: 109 MMHG

## 2020-02-25 VITALS — DIASTOLIC BLOOD PRESSURE: 63 MMHG | SYSTOLIC BLOOD PRESSURE: 108 MMHG

## 2020-02-25 VITALS — SYSTOLIC BLOOD PRESSURE: 114 MMHG | DIASTOLIC BLOOD PRESSURE: 66 MMHG

## 2020-02-25 LAB
BASOPHILS # BLD AUTO: 0 /CMM (ref 0–0.2)
BASOPHILS NFR BLD AUTO: 0.4 % (ref 0–2)
BUN SERPL-MCNC: 21 MG/DL (ref 7–18)
CALCIUM SERPL-MCNC: 8.2 MG/DL (ref 8.5–10.1)
CHLORIDE SERPL-SCNC: 106 MMOL/L (ref 98–107)
CO2 SERPL-SCNC: 25 MMOL/L (ref 21–32)
CREAT SERPL-MCNC: 1.2 MG/DL (ref 0.6–1.3)
EOSINOPHIL NFR BLD AUTO: 2.4 % (ref 0–6)
GLUCOSE SERPL-MCNC: 99 MG/DL (ref 74–106)
HCT VFR BLD AUTO: 25 % (ref 33–45)
HGB BLD-MCNC: 8.7 G/DL (ref 11.5–14.8)
LYMPHOCYTES NFR BLD AUTO: 1.1 /CMM (ref 0.8–4.8)
LYMPHOCYTES NFR BLD AUTO: 11.2 % (ref 20–44)
MAGNESIUM SERPL-MCNC: 2.1 MG/DL (ref 1.8–2.4)
MCHC RBC AUTO-ENTMCNC: 35 G/DL (ref 31–36)
MCV RBC AUTO: 94 FL (ref 82–100)
MONOCYTES NFR BLD AUTO: 0.3 /CMM (ref 0.1–1.3)
MONOCYTES NFR BLD AUTO: 2.6 % (ref 2–12)
NEUTROPHILS # BLD AUTO: 8.5 /CMM (ref 1.8–8.9)
NEUTROPHILS NFR BLD AUTO: 83.4 % (ref 43–81)
PHOSPHATE SERPL-MCNC: 2.9 MG/DL (ref 2.5–4.9)
PLATELET # BLD AUTO: 590 /CMM (ref 150–450)
POTASSIUM SERPL-SCNC: 3.7 MMOL/L (ref 3.5–5.1)
RBC # BLD AUTO: 2.69 MIL/UL (ref 4–5.2)
SODIUM SERPL-SCNC: 141 MMOL/L (ref 136–145)
WBC NRBC COR # BLD AUTO: 10.2 K/UL (ref 4.3–11)

## 2020-02-25 RX ADMIN — DEXTROSE MONOHYDRATE SCH MLS/HR: 50 INJECTION, SOLUTION INTRAVENOUS at 05:37

## 2020-02-25 RX ADMIN — LACTULOSE SCH G: 10 SOLUTION ORAL at 12:35

## 2020-02-25 RX ADMIN — CALCIUM CARBONATE-CHOLECALCIFEROL TAB 250 MG-125 UNIT SCH UDTAB: 250-125 TAB at 08:57

## 2020-02-25 RX ADMIN — SORBITOL SOLUTION (BULK) SCH ML: 70 SOLUTION at 08:57

## 2020-02-25 RX ADMIN — Medication SCH MG: at 08:57

## 2020-02-25 RX ADMIN — Medication SCH MG: at 17:11

## 2020-02-25 RX ADMIN — Medication SCH MG: at 17:10

## 2020-02-25 RX ADMIN — PANTOPRAZOLE SODIUM SCH MG: 40 TABLET, DELAYED RELEASE ORAL at 07:36

## 2020-02-25 RX ADMIN — LACTULOSE SCH G: 10 SOLUTION ORAL at 02:21

## 2020-02-25 RX ADMIN — LACTULOSE SCH G: 10 SOLUTION ORAL at 08:55

## 2020-02-25 RX ADMIN — Medication SCH MG: at 21:17

## 2020-02-25 RX ADMIN — LEVOTHYROXINE SODIUM SCH MCG: 100 TABLET ORAL at 07:36

## 2020-02-25 RX ADMIN — DEXTROSE MONOHYDRATE SCH MLS/HR: 50 INJECTION, SOLUTION INTRAVENOUS at 12:34

## 2020-02-25 RX ADMIN — FLUTICASONE PROPIONATE SCH GM: 50 SPRAY, METERED NASAL at 17:12

## 2020-02-25 RX ADMIN — GABAPENTIN SCH MG: 300 CAPSULE ORAL at 17:11

## 2020-02-25 RX ADMIN — LACTULOSE SCH G: 10 SOLUTION ORAL at 19:24

## 2020-02-25 RX ADMIN — DEXTROSE MONOHYDRATE SCH MLS/HR: 50 INJECTION, SOLUTION INTRAVENOUS at 21:17

## 2020-02-25 RX ADMIN — ATORVASTATIN CALCIUM SCH MG: 10 TABLET, FILM COATED ORAL at 21:17

## 2020-02-25 RX ADMIN — GABAPENTIN SCH MG: 400 CAPSULE ORAL at 21:18

## 2020-02-25 RX ADMIN — GABAPENTIN SCH MG: 300 CAPSULE ORAL at 12:35

## 2020-02-25 RX ADMIN — MEROPENEM SCH MLS/HR: 1 INJECTION INTRAVENOUS at 06:01

## 2020-02-25 RX ADMIN — Medication SCH MG: at 09:00

## 2020-02-25 RX ADMIN — GABAPENTIN SCH MG: 300 CAPSULE ORAL at 08:57

## 2020-02-25 RX ADMIN — FOLIC ACID SCH MG: 1 TABLET ORAL at 08:56

## 2020-02-25 RX ADMIN — FLUTICASONE PROPIONATE SCH GM: 50 SPRAY, METERED NASAL at 08:59

## 2020-02-25 RX ADMIN — ACETAMINOPHEN PRN MG: 325 TABLET ORAL at 09:11

## 2020-02-25 NOTE — NUR
RN OPENING NOTES



RECEIVED PATIENT RESTING IN BED COMFORTABLY, SHE IS AOX2-3, VERBAL, AND AMBULATORY WITH 
ASSIST W/ UNSTEADY GAIT. SHE IS ON RA, TOLERATING WELL, NO SOB OR RESP DISTRESS. TELE 
MONITOR SHOWING ST. SKIN IS INTACT, LUNGS SOUND CLEAR BILATERALLY. IV SITE ON LWRSIT IS 
PATENT AND INTACT. SAFETY MEASURES HAVE BEEN IMPLEMENTED, CALL LIGHT IS WITHIN REACH, BED IS 
IN LOWEST AND LOCKED POSITION, SIDE RAILS UP X2, WILL CONTINUE TO MONITOR FOR OTHER CHANGES. 
SAFETY MEASURES HAVE BEEN IMPLEMENTED, CALL LIGHT IS WITHIN REACH, BED IS IN LOWEST AND 
LOCKED POSITION, SIDE RAILS UP X2, WILL CONTINUE TO MONITOR FOR OTHER CHANGES.

## 2020-02-25 NOTE — NUR
RN OPENING NOTES



PATIENT IS RESTING IN BED COMFORTABLY AT THIS TIME. SHE IS ON RA, TOLERATING WELL NO SOB OR 
RESP DISTRESS. PT NEEDS HAVE BEEN MET, VITAL SIGNS ARE STABLE, NO ACUTE CHANGES OCCURRED 
THROUGHOUT THE SHIFT. SAFETY MEASURES HAVE BEEN IMPLEMENTED, CALL LIGHT IS WITHIN REACH, BED 
IS IN LOWEST AND LOCKED POSITION, SIDE RAILS UP X2, PT HAS BEEN ENDORSED TO NIGHTSHIFT RN 
FOR BOWEN.

## 2020-02-25 NOTE — NUR
TELE RN NOTES,



RECEIVED PATIENT  AWAKE IN BED A/O TO SELF,  EATING DINNER AT THIS TIME, VERY FORGETFUL, 
BREATHING EVEN AND UNLABORED, NO SOB/ACUTE DISTRESS NOTED AT THIS TIME, DENIES PAIN OR 
DISCOMFORT AT THIS TIME,  WITH HOB ELEVATED, NSR WITH HR 90S AT THIS TIME, IV ON LEFT HAND 
PATENT AND INTACT, NO SIGNS OF INFILTRATION NOTED AT THIS TIME CALL LIGHT WITHIN REACH,  BED 
 LOCKED AND LOWEST POSITION,  BED ALARM ON, EDUCATED ON CALL FOR ASSISTANCE, ALL SAFETY 
PRECAUTIONS IN PLACED, REORIENTATION PROVIDED AS NEED IT, WILL CONTINUE TO MONITOR CLOSELY

## 2020-02-25 NOTE — NUR
TELE RN NOTES,



PATIENT  SLEEPING AT THIS TIME, BREATHING EVEN AND UNLABORED, NO SOB/ACUTE DISTRESS NOTED AT 
THIS TIME, NO SIGNIFICANT CHANGE IN CONDITION DURING THE NIGHT, IV SITE IN LEFT WRIST  
PATENT AND INTACT AND ANTIBIOTIC INFUSING AT THIS TIME,   BED  LOCKED AND LOWEST POSITION,  
BED ALARM ON,  WILL ENDORSE TO ONCOMING NURSE FOR CONTINUITY OF CARE.

## 2020-02-26 VITALS — DIASTOLIC BLOOD PRESSURE: 69 MMHG | SYSTOLIC BLOOD PRESSURE: 108 MMHG

## 2020-02-26 VITALS — DIASTOLIC BLOOD PRESSURE: 53 MMHG | SYSTOLIC BLOOD PRESSURE: 113 MMHG

## 2020-02-26 VITALS — SYSTOLIC BLOOD PRESSURE: 111 MMHG | DIASTOLIC BLOOD PRESSURE: 69 MMHG

## 2020-02-26 VITALS — SYSTOLIC BLOOD PRESSURE: 110 MMHG | DIASTOLIC BLOOD PRESSURE: 64 MMHG

## 2020-02-26 VITALS — DIASTOLIC BLOOD PRESSURE: 77 MMHG | SYSTOLIC BLOOD PRESSURE: 118 MMHG

## 2020-02-26 VITALS — DIASTOLIC BLOOD PRESSURE: 71 MMHG | SYSTOLIC BLOOD PRESSURE: 135 MMHG

## 2020-02-26 VITALS — DIASTOLIC BLOOD PRESSURE: 69 MMHG | SYSTOLIC BLOOD PRESSURE: 111 MMHG

## 2020-02-26 RX ADMIN — GABAPENTIN SCH MG: 300 CAPSULE ORAL at 08:20

## 2020-02-26 RX ADMIN — LACTULOSE SCH G: 10 SOLUTION ORAL at 01:53

## 2020-02-26 RX ADMIN — LACTULOSE SCH G: 10 SOLUTION ORAL at 08:24

## 2020-02-26 RX ADMIN — FOLIC ACID SCH MG: 1 TABLET ORAL at 08:20

## 2020-02-26 RX ADMIN — DEXTROSE MONOHYDRATE SCH MLS/HR: 50 INJECTION, SOLUTION INTRAVENOUS at 05:18

## 2020-02-26 RX ADMIN — SORBITOL SOLUTION (BULK) SCH ML: 70 SOLUTION at 08:33

## 2020-02-26 RX ADMIN — Medication SCH MG: at 17:22

## 2020-02-26 RX ADMIN — LACTULOSE SCH G: 10 SOLUTION ORAL at 13:06

## 2020-02-26 RX ADMIN — Medication SCH MG: at 08:20

## 2020-02-26 RX ADMIN — Medication SCH MG: at 08:21

## 2020-02-26 RX ADMIN — SORBITOL SOLUTION (BULK) SCH ML: 70 SOLUTION at 08:20

## 2020-02-26 RX ADMIN — GABAPENTIN SCH MG: 300 CAPSULE ORAL at 17:22

## 2020-02-26 RX ADMIN — GABAPENTIN SCH MG: 300 CAPSULE ORAL at 13:06

## 2020-02-26 RX ADMIN — Medication SCH MG: at 08:19

## 2020-02-26 RX ADMIN — Medication SCH MG: at 17:00

## 2020-02-26 RX ADMIN — FLUTICASONE PROPIONATE SCH GM: 50 SPRAY, METERED NASAL at 17:23

## 2020-02-26 RX ADMIN — LEVOTHYROXINE SODIUM SCH MCG: 100 TABLET ORAL at 08:20

## 2020-02-26 RX ADMIN — CALCIUM CARBONATE-CHOLECALCIFEROL TAB 250 MG-125 UNIT SCH UDTAB: 250-125 TAB at 08:20

## 2020-02-26 RX ADMIN — GABAPENTIN SCH MG: 400 CAPSULE ORAL at 21:07

## 2020-02-26 RX ADMIN — FLUTICASONE PROPIONATE SCH GM: 50 SPRAY, METERED NASAL at 09:00

## 2020-02-26 RX ADMIN — ATORVASTATIN CALCIUM SCH MG: 10 TABLET, FILM COATED ORAL at 21:07

## 2020-02-26 RX ADMIN — PANTOPRAZOLE SODIUM SCH MG: 40 TABLET, DELAYED RELEASE ORAL at 08:19

## 2020-02-26 RX ADMIN — DEXTROSE MONOHYDRATE SCH MLS/HR: 50 INJECTION, SOLUTION INTRAVENOUS at 20:33

## 2020-02-26 RX ADMIN — ACETAMINOPHEN PRN MG: 325 TABLET ORAL at 09:44

## 2020-02-26 RX ADMIN — LACTULOSE SCH G: 10 SOLUTION ORAL at 19:30

## 2020-02-26 RX ADMIN — Medication SCH MG: at 20:33

## 2020-02-26 RX ADMIN — DEXTROSE MONOHYDRATE SCH MLS/HR: 50 INJECTION, SOLUTION INTRAVENOUS at 13:06

## 2020-02-26 RX ADMIN — SORBITOL SOLUTION (BULK) SCH ML: 70 SOLUTION at 08:32

## 2020-02-26 NOTE — NUR
TELE RN NOTES,



PATIENT  ASLEEP, BUT AROUSES EASILY TO VERBAL STIMULI,  BREATHING EVEN AND UNLABORED, NO 
SOB/ACUTE DISTRESS NOTED AT THIS TIME, NO SIGNIFICANT CHANGE IN CONDITION DURING THE NIGHT, 
IV SITE IN RIGHT WRIST  PATENT AND INTACT AND ANTIBIOTIC INFUSING AT THIS TIME,   BED  
LOCKED AND LOWEST POSITION,  BED ALARM ON, 2X BM LAST NIGHT, CONTINUE ON LACTULOSE Q6HRS,  
WILL  ENDORSE CONTINUITY OF CARE TO ONCOMING NURSE.

## 2020-02-26 NOTE — NUR
RN OPENING NOTES

PATIENT  ASLEEP, BUT AROUSES EASILY TO VERBAL STIMULI,  BREATHING EVEN AND UNLABORED, NO 
SOB/ACUTE DISTRESS NOTED AT THIS TIME, PT HAS IV SITE IN RIGHT WRIST  PATENT AND INTACT.   
BED  LOCKED AND LOWEST POSITION,  BED ALARM ON, WILL CONTINUE TO MONITOR.

## 2020-02-26 NOTE — NUR
RN CLOSING NOTES

PATIENT  IS AWAKE DENIES SOB AND PAIN AT THIS CURRENT TIME. PT IS A&OX2 BUT FORGETFUL. PT IS 
ABLE TO AMBULATE TO RESTROOM WITH ASSIST AND WALKER THAT IS BEDSIDE. PT SKIN INTACT.  PT HAS 
IV SITE IN RIGHT WRIST  PATENT AND INTACT.   BED IS LOCKED AND LOWEST POSITION,  BED ALARM 
ON, WILL ENDORSE BOWEN TO NIGHT SHIFT RN.

## 2020-02-26 NOTE — NUR
MS RN OPENING NOTE 



RECEIVED AMBULATING TO RESTROOM WITH WALKER, CNA CALLED. A/OX2, PER REPORT PATIENT IS VERY 
FORGETFUL. TOLERATING ROOM AIR. RESPIRATIONS ARE EVEN AND UNLABORED. NO S/S SOB NOTED. IN NO 
APPARENT DISTRESS. IV ACCESS IN LEFT HAND 322 PATENT AND SALINE LOCKED. BED IS LOW AND 
LOCKED, HOB ELEVATED, SIDE RIALS UPX2, BED ALARM ON WHEN PATIENT CAME BACK FROM RESTROOM. 
CALL LIGHT WITHIN REACH. WILL CONTINUE TO MONITOR.

## 2020-02-27 VITALS — SYSTOLIC BLOOD PRESSURE: 125 MMHG | DIASTOLIC BLOOD PRESSURE: 70 MMHG

## 2020-02-27 VITALS — SYSTOLIC BLOOD PRESSURE: 113 MMHG | DIASTOLIC BLOOD PRESSURE: 53 MMHG

## 2020-02-27 VITALS — DIASTOLIC BLOOD PRESSURE: 54 MMHG | SYSTOLIC BLOOD PRESSURE: 112 MMHG

## 2020-02-27 RX ADMIN — Medication SCH MG: at 08:18

## 2020-02-27 RX ADMIN — SORBITOL SOLUTION (BULK) SCH ML: 70 SOLUTION at 08:16

## 2020-02-27 RX ADMIN — GABAPENTIN SCH MG: 300 CAPSULE ORAL at 08:18

## 2020-02-27 RX ADMIN — CALCIUM CARBONATE-CHOLECALCIFEROL TAB 250 MG-125 UNIT SCH UDTAB: 250-125 TAB at 08:18

## 2020-02-27 RX ADMIN — LEVOTHYROXINE SODIUM SCH MCG: 100 TABLET ORAL at 08:18

## 2020-02-27 RX ADMIN — LACTULOSE SCH G: 10 SOLUTION ORAL at 01:30

## 2020-02-27 RX ADMIN — FLUTICASONE PROPIONATE SCH GM: 50 SPRAY, METERED NASAL at 08:17

## 2020-02-27 RX ADMIN — LACTULOSE SCH G: 10 SOLUTION ORAL at 08:16

## 2020-02-27 RX ADMIN — Medication SCH MG: at 08:19

## 2020-02-27 RX ADMIN — PANTOPRAZOLE SODIUM SCH MG: 40 TABLET, DELAYED RELEASE ORAL at 08:18

## 2020-02-27 RX ADMIN — FOLIC ACID SCH MG: 1 TABLET ORAL at 08:18

## 2020-02-27 RX ADMIN — DEXTROSE MONOHYDRATE SCH MLS/HR: 50 INJECTION, SOLUTION INTRAVENOUS at 05:32

## 2020-02-27 NOTE — NUR
MS RN NOTE



DID NOT ADMINISTER CEPHULAC 20G D/T PATIENT HAVING DIARRHEA. WILL CONTINUE TO MONITOR.

## 2020-02-27 NOTE — NUR
MS1/RN     DISCHARGE - SNF



REPORT GIVEN TO HAILEE NARVAEZ RN, KENN YANG.  DISCHARGE INSTRUCTIONS GIVEN TO 
TRANSPORT PERSONNEL AND PT'S SON.  DISCHARGE DOCUMENTS GIVEN TO PT'S SON & TRANSPORT 
PERSONNEL.  IV SITE KEPT PER REQUEST OF KENN YANG RN OF [facility].  ID BANDS 
REMOVED, PERSONAL BELONGINGS RETURNED, INVENTORY LOG SIGNED OFF.  PT LEFT MS1 UNIT IN STABLE 
CONDITION VIA GURNEY.

## 2020-02-27 NOTE — NUR
MS RN OPENING NOTE 



PATIENT IN BED. A/OX2-3, VERY FORGETFUL. TOLERATING ROOM AIR. RESPIRATIONS ARE EVEN AND 
UNLABORED. NO SOB NOTED. NO DISTRESS NOTED. IV ACCESS IN LAC#20 RUNNING TKO. BED REMAINS LOW 
AND LOCKED, HOB ELEVATED, SIDE RIALS UPX2, BED ALARM. CALL LIGHT WITHIN REACH. WILL ENDORSE 
TO NEXT SHIFT.

## 2020-02-27 NOTE — NUR
MS1/RN     AM SHIFT OPENING NOTES



RECEIVED PT AWAKE SITING IN BED. PT A/O 2-3 FORGETFUL, PLEASANT. DENIES ANY SYMPTOMS, NO 
DISTRESS OR ACUTE CHANGE OF CONDITION NOTED. ON ROOM AIR SATURATING @ 99%, RESPIRATIONS EVEN 
& UNLABORED, LUNG SOUNDS CLEAR. IV SITE FLUSHED, PATENT WITH NO S/S OF INFECTION, SL. PT IS 
COMFORTABLE, SCHEDULED AM MEDS TO BE GIVEN. CL WITHIN REACHED AND SAFETY MAINTAINED. ON 
GOING MONITORING.

## 2023-05-25 NOTE — NUR
temperature 100, oral,  cooling measures initiated, Tylenol PRN given [Liver disease] : no liver disease